# Patient Record
Sex: MALE | Race: ASIAN | Employment: STUDENT | URBAN - METROPOLITAN AREA
[De-identification: names, ages, dates, MRNs, and addresses within clinical notes are randomized per-mention and may not be internally consistent; named-entity substitution may affect disease eponyms.]

---
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
## 2024-03-11 ENCOUNTER — OFFICE VISIT (OUTPATIENT)
Dept: FAMILY MEDICINE CLINIC | Facility: CLINIC | Age: 19
End: 2024-03-11
Payer: COMMERCIAL

## 2024-03-11 VITALS
RESPIRATION RATE: 16 BRPM | DIASTOLIC BLOOD PRESSURE: 60 MMHG | SYSTOLIC BLOOD PRESSURE: 120 MMHG | BODY MASS INDEX: 24 KG/M2 | WEIGHT: 152 LBS | OXYGEN SATURATION: 99 % | HEART RATE: 80 BPM | TEMPERATURE: 97 F

## 2024-03-11 DIAGNOSIS — M25.511 ACUTE PAIN OF RIGHT SHOULDER: ICD-10-CM

## 2024-03-11 DIAGNOSIS — K13.70 ORAL LESION: Primary | ICD-10-CM

## 2024-03-11 PROCEDURE — 3078F DIAST BP <80 MM HG: CPT | Performed by: NURSE PRACTITIONER

## 2024-03-11 PROCEDURE — 3074F SYST BP LT 130 MM HG: CPT | Performed by: NURSE PRACTITIONER

## 2024-03-11 PROCEDURE — 99213 OFFICE O/P EST LOW 20 MIN: CPT | Performed by: NURSE PRACTITIONER

## 2024-03-11 RX ORDER — DEXAMETHASONE 0.5 MG/5ML
0.5 SOLUTION ORAL 2 TIMES DAILY
Qty: 70 ML | Refills: 0 | Status: SHIPPED | OUTPATIENT
Start: 2024-03-11 | End: 2024-03-18

## 2024-03-11 NOTE — PATIENT INSTRUCTIONS
Use Dexamethasone elixir  5 ml swish and spit 2 times daily. It is important to keep the medication in the mouth for five minutes prior to spitting it out. Do not rinse afterward and avoid eating or drinking for 30 minutes.

## 2024-03-11 NOTE — PROGRESS NOTES
Chief Complaint   Patient presents with    Throat Problem     X2 months       HPI:  Presents with approx 2 day history of painful red lesion to upper right side of mouth. Also reports has had approx 2 week history of sore throat but this seems better in past 2 days. Denies fever/chills, cough, nasal/sinus congestion, ear pain/pressure, SOB/ALBRECHT, body aches or fatigue. Has been treating with ibuprofen with minimal relief. Does smoke cigarettes and also uses oral nicotine pouches.     Also, reports approx 3 week history of anterior and lateral right shoulder pain that started during sparring session. Reports pain is worse with internal rotation and lifting items upward.  Reports pain is progressively getting better. Denies redness, swelling or limited ROM to shoulder. Has been treating with only rest.   3 weeks right shoulder pain with sparring.    History reviewed. No pertinent past medical history.    Patient Active Problem List   Diagnosis    Lymphadenopathy, inguinal    Lymphadenopathy, axillary       Current Outpatient Medications   Medication Sig Dispense Refill    dexamethasone 0.5 MG/5ML Oral Solution Take 5 mL (0.5 mg total) by mouth 2 (two) times daily for 7 days. 70 mL 0    hydrocortisone 2.5 % External Cream Apply 1 Application topically 2 (two) times daily. Apply every morning and evening. 28 g 2    mefloquine 250 MG Oral Tab Take 1 tablet (250 mg total) by mouth every 7 days. Start 1 week before trip and continue 4 weeks afterwards 9 tablet 0    finasteride (PROPECIA) 1 MG Oral Tab Take 1 tablet (1 mg total) by mouth daily. 30 tablet 2    triamcinolone acetonide 0.1 % External Cream Apply 1 Application topically 2 (two) times daily. 80 g 3    ibuprofen 600 MG Oral Tab Take 1 tablet (600 mg total) by mouth. (Patient not taking: Reported on 3/11/2024)      Fluticasone Propionate 50 MCG/ACT Nasal Suspension 2 sprays by Each Nare route daily. (Patient not taking: Reported on 3/11/2024) 1 Inhaler 0        Physical Exam  /60   Pulse 80   Temp 97 °F (36.1 °C)   Resp 16   Wt 152 lb (68.9 kg)   SpO2 99%   BMI 24.17 kg/m²   Constitutional: well developed, well nourished, in no apparent distress  HEENT: Normocephalic and atraumatic. Tympanic membranes clear bilat w/o bulging, erythema or air/fluid level. Posterior oropharynx is erythematous w/o lesions. Upper right palate with localized erythematous lesion with some TTP, no fluctuance or induration. No open lesion.   Eyes: Conjunctivae are pink and moist without exudate or drainage. EOMI.  Lymphadenopathy: No cervical adenopathy.   Cardiovascular: Normal rate, regular rhythm.  No murmur.   Pulmonary/Chest: No respiratory distress. Effort normal. Breath sounds clear bilaterally. No wheezes, rhonchi or rales  MS: right shoulder w/o edema, erythema, masses, deformity or TTP. Muscle strength bilat shoulders 5/5. Negative drop test. Negative internal rotation test. Pain reproduced with anterior elevation of arm against resistance but PROM WNL.    Skin: Skin is warm and dry. No rash noted. No erythema. No pallor.     A/P:    Encounter Diagnoses   Name Primary?    Oral lesion- unclear etiology. trial dexamethasone oral rinse. If no improvement notify office and will need to see ENT. Discussed must stop oral nicotine packs and should stop smoking as well. Verbalized understanding of instructions and agreeable to this plan of care.    Yes    Acute pain of right shoulder- trial PT, referral entered. Verbalized understanding of instructions and agreeable to this plan of care.           No orders of the defined types were placed in this encounter.      Meds & Refills for this Visit:  Requested Prescriptions     Signed Prescriptions Disp Refills    dexamethasone 0.5 MG/5ML Oral Solution 70 mL 0     Sig: Take 5 mL (0.5 mg total) by mouth 2 (two) times daily for 7 days.       Imaging & Consults:  OP REFERRAL TO [facility]    No follow-ups on  file.  Patient Instructions                       Use Dexamethasone elixir  5 ml swish and spit 2 times daily. It is important to keep the medication in the mouth for five minutes prior to spitting it out. Do not rinse afterward and avoid eating or drinking for 30 minutes.      All questions were answered and the patient understands the plan.

## 2024-11-25 ENCOUNTER — OFFICE VISIT (OUTPATIENT)
Dept: FAMILY MEDICINE CLINIC | Facility: CLINIC | Age: 19
End: 2024-11-25
Payer: COMMERCIAL

## 2024-11-25 VITALS
BODY MASS INDEX: 24.2 KG/M2 | HEART RATE: 88 BPM | OXYGEN SATURATION: 98 % | RESPIRATION RATE: 16 BRPM | WEIGHT: 152.38 LBS | HEIGHT: 66.46 IN | DIASTOLIC BLOOD PRESSURE: 60 MMHG | SYSTOLIC BLOOD PRESSURE: 110 MMHG

## 2024-11-25 DIAGNOSIS — Z02.89 ENCOUNTER FOR COMPLETION OF FORM WITH PATIENT: ICD-10-CM

## 2024-11-25 DIAGNOSIS — Z00.00 ROUTINE PHYSICAL EXAMINATION: Primary | ICD-10-CM

## 2024-11-25 DIAGNOSIS — L30.9 ECZEMA, UNSPECIFIED TYPE: ICD-10-CM

## 2024-11-25 RX ORDER — TACROLIMUS 0.3 MG/G
1 OINTMENT TOPICAL 2 TIMES DAILY
Qty: 30 G | Refills: 0 | Status: SHIPPED | OUTPATIENT
Start: 2024-11-25

## 2024-11-25 NOTE — PROGRESS NOTES
Tan Lozano is a 19 year old male who presents for a complete physical exam.     HPI:   Pt complains of persistent redness, itching and swelling around eyes, reports flares up approx every 2-3 weeks. Manages with hydrocortisone 2.5% for 2-4 days and it will seem to resolve. Denies fever/chills, blisters, bleeding, drainage, exudate from rash. Denies burning, tingling to rash.     Going to study semester abroad in [facility] in January 2025. Needs forms completed for this.     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 Is not  with no children. Feels he eats an overall healthy diet. Does not exercise routinely. Reports is an occasional smoker. Drinks 2-3 alcoholic drinks per week.     Colon cancer screening:  N/A    Wt Readings from Last 6 Encounters:   11/25/24 152 lb 6.4 oz (69.1 kg)   03/11/24 152 lb (68.9 kg)   08/16/23 152 lb 12.8 oz (69.3 kg) (48%, Z= -0.05)*   06/29/23 153 lb 3.2 oz (69.5 kg) (49%, Z= -0.02)*   01/29/20 128 lb (58.1 kg) (40%, Z= -0.25)*   01/14/19 138 lb (62.6 kg) (73%, Z= 0.61)*     * Growth percentiles are based on [facility] (Boys, 2-20 Years) data.       Cholesterol, Total (mg/dL)   Date Value   06/29/2023 125     HDL Cholesterol (mg/dL)   Date Value   06/29/2023 45     LDL Cholesterol (mg/dL)   Date Value   06/29/2023 63     AST (U/L)   Date Value   06/29/2023 18   08/13/2021 14     ALT (U/L)   Date Value   06/29/2023 22   08/13/2021 11      Current Outpatient Medications   Medication Sig Dispense Refill    Tacrolimus 0.03 % External Ointment Apply 1 Application topically 2 (two) times daily. 30 g 0    hydrocortisone 2.5 % External Cream Apply 1 Application topically 2 (two) times daily. Apply every morning and evening. 28 g 3    mefloquine 250 MG Oral Tab Take 1 tablet (250 mg total) by mouth every 7 days. Start 1 week before trip and continue 4 weeks afterwards 9 tablet 0    triamcinolone acetonide 0.1 % External Cream Apply 1 Application topically 2  (two) times daily. 80 g 3    finasteride (PROPECIA) 1 MG Oral Tab Take 1 tablet (1 mg total) by mouth daily. 30 tablet 2      History reviewed. No pertinent past medical history.   History reviewed. No pertinent surgical history.   History reviewed. No pertinent family history.   Social History     Socioeconomic History    Marital status: Single   Tobacco Use    Smoking status: Never    Smokeless tobacco: Never   Vaping Use    Vaping status: Former    Substances: Nicotine    Devices: Disposable   Substance and Sexual Activity    Alcohol use: Yes    Drug use: Not Currently    Sexual activity: Yes     Partners: Female      Social History: as above     Health Maintenance  Immunizations: Recommend flu vaccine for 5162-9527 flu season.     Immunization History   Administered Date(s) Administered    Covid-19 Vaccine Pfizer 30 mcg/0.3 ml 04/30/2021, 05/21/2021, 01/03/2022    DTAP 04/30/2004, 04/30/2004, 06/30/2004, 06/30/2004, 08/18/2004, 08/18/2004, 06/06/2005, 06/06/2005, 06/02/2008, 06/02/2008    HEP A,Ped/Adol,(2 Dose) 10/11/2006, 08/30/2007    Hep B, Unspecified Formulation 02/27/2004, 03/31/2004, 08/18/2004    Hib, Unspecified Formulation 04/30/2004, 06/30/2004, 08/18/2004, 06/06/2005    IPV 04/30/2004, 06/30/2004, 08/18/2004, 06/02/2008    Influenza 11/21/2005    MMR 03/01/2005, 06/02/2008    Pneumococcal (Prevnar 13) 04/30/2004, 06/30/2004, 08/18/2004, 06/06/2005    Pneumococcal (Prevnar 7) 04/30/2004, 06/30/2004, 08/18/2004, 06/06/2005    Varicella 03/01/2005, 06/02/2008         REVIEW OF SYSTEMS:   GENERAL: feels well otherwise. No fever, chills, malaise.  SKIN: denies any unusual skin lesions, rash or pruritis.  EYES: denies blurred/double vision, light sensitivity or visual disturbances.  HEENT: denies nasal congestion, sinus pain/tenderness. Denies neck stiffness.  LUNGS: denies dyspnea, wheezing, SOB, ALBRECHT, cough.  CARDIOVASCULAR: denies chest pain on exertion, palpitations, edema, orthopnea.  GI: denies  abdominal pain, heartburn, diarrhea or constipation.  : denies dysuria, frequency, urgency, hematuria, changes in stream or nocturia.   MUSCULOSKELETAL: denies back pain.  NEURO: denies headaches, dizziness, syncope.    EXAM:   /60   Pulse 88   Resp 16   Ht 5' 6.46\" (1.688 m)   Wt 152 lb 6.4 oz (69.1 kg)   SpO2 98%   BMI 24.26 kg/m²   Body mass index is 24.26 kg/m².   GENERAL: well developed, well nourished,in no apparent distress  SKIN: Skin warm/dry. Color appropriate for ethnicity. No suspicious lesions. Bilateral becki-orbital skin erythematous, mildly edematous w/o open lesions, drainage, fluctuance.   HEENT: atraumatic, normocephalic. Bilateral TM clear w/o erythema or bulge  EYES: PERRLA, EOMI. Conjunctiva are clear. Sclera white  NECK: supple w/o masses/tenderness/ adenopathy, no bruits/JVD, Thyroid symmetrical w/o enlargement  LUNGS: clear to auscultation bilaterally, effort normal. Symmetrical expansion during respirations.  CARDIO: RRR without murmurs. No S3/S4.   GI: Soft, non-tender/non-distended, BS(+)x4, no masses, HSM or CVA tenderness.  MUSCULOSKELETAL: muscle strength grade 5 to all extremities, back non-tender.   EXTREMITIES: no edema, full ROM to all extremities. Patellar DTR 2+ bilaterally   NEURO: A/Ox3, cranial nerves II-XII intact, motor/sensory function grossly intact.     ASSESSMENT AND PLAN:     HEALTH MAINTENANCE:     Encounter Diagnoses   Name Primary?    Routine physical examination- adult male in his usual state of health. Discussed appropriate health guidance including importance of getting daily exercise and healthy diet choices. Labs reviewed in office today.    Yes    Eczema, unspecified type- tacrolimus BID for 5 days. Medication administration, use and side effects discussed. See Dr. Pearson for follow up. Cautioned not to use tacrolimus for long term. Use Curel Itch defense lotion for daily moisturizing.Verbalized understanding of instructions and agreeable to this  plan of care.        Encounter for completion of form with patient- forms completed as requested.         Orders Placed This Encounter   Procedures    COMP METABOLIC PANEL [69617] [Q]    CBC [6399] [Q]    TSH W REFLEX TO FREE T4 [84927][Q]       Meds & Refills for this Visit:  Requested Prescriptions     Signed Prescriptions Disp Refills    Tacrolimus 0.03 % External Ointment 30 g 0     Sig: Apply 1 Application topically 2 (two) times daily.       Imaging & Consults:  DERM - INTERNAL    No follow-ups on file.  Patient Instructions                 Apply Curel Itch Defense lotion daily, especially after bathing.

## 2024-11-26 ENCOUNTER — NURSE ONLY (OUTPATIENT)
Dept: FAMILY MEDICINE CLINIC | Facility: CLINIC | Age: 19
End: 2024-11-26
Payer: COMMERCIAL

## 2024-11-26 ENCOUNTER — TELEPHONE (OUTPATIENT)
Dept: FAMILY MEDICINE CLINIC | Facility: CLINIC | Age: 19
End: 2024-11-26

## 2024-11-26 VITALS — TEMPERATURE: 99 F

## 2024-11-26 DIAGNOSIS — Z23 NEED FOR DIPHTHERIA-TETANUS-PERTUSSIS (TDAP) VACCINE: Primary | ICD-10-CM

## 2024-11-26 PROCEDURE — 90715 TDAP VACCINE 7 YRS/> IM: CPT | Performed by: NURSE PRACTITIONER

## 2024-11-26 PROCEDURE — 90471 IMMUNIZATION ADMIN: CPT | Performed by: NURSE PRACTITIONER

## 2024-11-26 NOTE — PROGRESS NOTES
Patient presents for TDAP.  No complaints.  TDAP given in the left deltoid.  Patient is traveling abroad and needs this for school.  TDAP given in the left deltoid

## (undated) NOTE — LETTER
12/15/18    Patient: Demar Randall  : 2005 Visit date: 2018    Dear  Dr. Calli Carey MD,    Thank you for referring Demar Randall to my practice. Please find my assessment and plan below.                 Assessment   Lymphadenopathy, inguinal  ( without any extra structures or abnormalities. There is a palpable lymph node below the inguinal ligament on the right side. It is surrounded by smaller lymph nodes. The largest node does still measure approximately 3 cm. It is currently nontender.  Ther anything of a neoplastic nature. Neoplasm cannot entirely be ruled out. I recommend that he follows up with me in 6 weeks. If the lymph node continues to diminish in size, this will be followed with a 3 month visit after that point.      If there is no

## (undated) NOTE — LETTER
19    Patient: Lashon Valentin  : 2005 Visit date: 2019    Dear  Dr. Beatris Dias MD,    Thank you for referring Lashon Valentin to my practice. Please find my assessment and plan below.       Assessment   Lymphadenopathy, inguinal  (primary enc